# Patient Record
Sex: FEMALE
[De-identification: names, ages, dates, MRNs, and addresses within clinical notes are randomized per-mention and may not be internally consistent; named-entity substitution may affect disease eponyms.]

---
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
## 2018-08-22 ENCOUNTER — HOSPITAL ENCOUNTER (EMERGENCY)
Dept: HOSPITAL 45 - C.EDB | Age: 18
Discharge: HOME | End: 2018-08-22
Payer: COMMERCIAL

## 2018-08-22 VITALS
BODY MASS INDEX: 41.02 KG/M2 | WEIGHT: 293 LBS | BODY MASS INDEX: 41.02 KG/M2 | WEIGHT: 293 LBS | HEIGHT: 70.98 IN | HEIGHT: 70.98 IN

## 2018-08-22 VITALS — SYSTOLIC BLOOD PRESSURE: 133 MMHG | OXYGEN SATURATION: 97 % | HEART RATE: 76 BPM | DIASTOLIC BLOOD PRESSURE: 86 MMHG

## 2018-08-22 VITALS — TEMPERATURE: 98.24 F

## 2018-08-22 DIAGNOSIS — F12.90: ICD-10-CM

## 2018-08-22 DIAGNOSIS — Z79.899: ICD-10-CM

## 2018-08-22 DIAGNOSIS — R11.2: Primary | ICD-10-CM

## 2018-08-22 DIAGNOSIS — Z79.3: ICD-10-CM

## 2018-08-22 DIAGNOSIS — Z87.891: ICD-10-CM

## 2018-08-22 DIAGNOSIS — E11.9: ICD-10-CM

## 2018-08-22 DIAGNOSIS — R19.7: ICD-10-CM

## 2018-08-22 LAB
ALBUMIN SERPL-MCNC: 4.2 GM/DL (ref 3.4–5)
ALP SERPL-CCNC: 83 U/L (ref 45–117)
ALT SERPL-CCNC: 149 U/L (ref 12–78)
AST SERPL-CCNC: 83 U/L (ref 15–37)
BASOPHILS # BLD: 0.03 K/UL (ref 0–0.2)
BASOPHILS NFR BLD: 0.4 %
BUN SERPL-MCNC: 9 MG/DL (ref 7–18)
CALCIUM SERPL-MCNC: 9.9 MG/DL (ref 8.5–10.1)
CO2 SERPL-SCNC: 24 MMOL/L (ref 21–32)
CREAT SERPL-MCNC: 1.05 MG/DL (ref 0.6–1.2)
EOS ABS #: 0.3 K/UL (ref 0–0.5)
EOSINOPHIL NFR BLD AUTO: 351 K/UL (ref 130–400)
GLUCOSE SERPL-MCNC: 106 MG/DL (ref 70–99)
HCT VFR BLD CALC: 41.3 % (ref 37–47)
HGB BLD-MCNC: 14.2 G/DL (ref 12–16)
IG#: 0.01 K/UL (ref 0–0.02)
IMM GRANULOCYTES NFR BLD AUTO: 21.3 %
LIPASE: 210 U/L (ref 73–393)
LYMPHOCYTES # BLD: 1.57 K/UL (ref 1.2–3.4)
MCH RBC QN AUTO: 30.7 PG (ref 25–34)
MCHC RBC AUTO-ENTMCNC: 34.4 G/DL (ref 32–36)
MCV RBC AUTO: 89.2 FL (ref 80–100)
MONO ABS #: 0.54 K/UL (ref 0.11–0.59)
MONOCYTES NFR BLD: 7.3 %
NEUT ABS #: 4.93 K/UL (ref 1.4–6.5)
NEUTROPHILS # BLD AUTO: 4.1 %
NEUTROPHILS NFR BLD AUTO: 66.8 %
PMV BLD AUTO: 10.1 FL (ref 7.4–10.4)
POTASSIUM SERPL-SCNC: 3.7 MMOL/L (ref 3.5–5.1)
PROT SERPL-MCNC: 8.4 GM/DL (ref 6.4–8.2)
RED CELL DISTRIBUTION WIDTH CV: 12.9 % (ref 11.5–14.5)
RED CELL DISTRIBUTION WIDTH SD: 41.7 FL (ref 36.4–46.3)
SODIUM SERPL-SCNC: 139 MMOL/L (ref 136–145)
WBC # BLD AUTO: 7.38 K/UL (ref 4.8–10.8)

## 2018-08-22 NOTE — EMERGENCY ROOM VISIT NOTE
History


First contact with patient:  09:20


Chief Complaint:  VOMITING


Stated Complaint:  NAUSEA,VOMITING,DIARRHEA,STOMACH PAIN





History of Present Illness


The patient is a 18 year old female who presents to the Emergency Room with 

complaints of nausea x 4 weeks.  She was started on a new DM2 medication 

similar to Metformin called OZEMPIC in May.  She was titreated up to 1mg daily 

after 6 weeks.  On her 10th week of treatment she developed significant and 

persistent vomiting with meals.  She also reports 3-4x loose BM during this 

time period.  She can tolerate water OK.  She denies any recent travel, denies 

eating new foods, denies any antibiotic use in the past 3 months. 





She has taken TUMS or Pepto Bismol for the treatment of her symptoms without 

improvement.  





No other contacts have been sick.  She is a [employer] student, lives in [facility], was a 

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 





SHx:  Denies ETOH abuse, used marijuana 2-3x in the past month or so, her 

symptoms go away with marijuana use. 





ROS: No history of migraines.





Review of Systems


See HPI for pertinent positives and negatives.  A total of ten systems were 

reviewed and were otherwise negative.





   Constitutional:  No fever, No chills


   ENT:  No hearing loss


   Respiratory:  No cough, No sputum, No wheezing, No shortness of breath


   Cardiovascular:  No chest pain


   Abdomen:  + nausea, + diarrhea, No pain, No vomiting


   Musculoskeletal:  No joint pain


   Genitourinary - Female:  No dysuria, No urinary frequency, No urinary urgency

, No dysmenorrhea


   Endocrine:  No fatigue


   Integumentary:  No rash





Social History


Smoking Status:  Former Smoker





Current/Historical Medications


Scheduled


Birth Control Pills (Birth Control Pills), 1 TAB PO DAILY





Physical Exam


Vital Signs











  Date Time  Temp Pulse Resp B/P (MAP) Pulse Ox O2 Delivery O2 Flow Rate FiO2


 


8/22/18 12:56  62 20 124/78 99 Room Air  


 


8/22/18 11:34  68 18 141/68 97 Room Air  


 


8/22/18 09:08 36.8 92 18 143/95 97 Room Air  











Physical Exam


Gen: No acute distress.   Obese.





HEENT: Head - normocephalic and atraumatic.  Pupils are equal, round, and 

reactive to light.  Extraocular eye muscles are intact and sclera are 

anicteric.  Ears -  bilaterally patent canals with noninjected tympanic 

membranes and no evidence of hemotympanum.  Nose -  moist nasal mucosa without 

discharge. Mouth - moist buccal mucosa.  Oropharynx is nonerythematous and 

there is no tonsillar exudate or edema noted.





Neck: Supple; no JVD, nuchal rigidity, cervical lymphadenopathy, or auscultated 

bruits.





Heart: Regular rate and rhythm.  There is a normal S1 and S2 with no murmurs, 

clicks, or gallops appreciated.





Lungs: Clear to auscultation bilaterally with no wheezes, rales, or rhonchi.





Abdomen: Soft, completely nontender, nondistended, with good bowel sounds.  

There are no palpable pulsatile masses or hepatosplenomegaly.  There is no 

guarding, rigidity, or rebound noted.





Extremities: No evidence of cyanosis, clubbing, or edema.  There are easily 

palpable peripheral pulses.





Neuro:The patient is awake and alert, oriented to day, time, and place.  Muscle 

strength is 5/5 in all 4 extremities.  The patient has equal  strength and 

equal pedal push and pull.  There are no cerebellar signs.





Medical Decision & Procedures


ER Provider


Diagnostic Interpretation:


ABDOMINAL ULTRASOUND, RIGHT UPPER QUADRANT





HISTORY:     Elevated LFTs, nausea, vomiting. 





COMPARISON:  None.





FINDINGS: Increased hepatic echogenicity represents fatty infiltration. The


liver is moderately enlarged. No hepatic lesions are identified although


sensitivity is diminished on this exam. No gallstones are identified. There is


no biliary ductal dilatation. Pancreas is obscured. There is no right


hydronephrosis.





IMPRESSION: 








1. Fatty infiltration of the liver and hepatomegaly.





2. No gallstones or biliary ductal dilatation.





3. Obscured pancreas due to suboptimal penetration.





Laboratory Results


8/22/18 09:45








Red Blood Count 4.63, Mean Corpuscular Volume 89.2, Mean Corpuscular Hemoglobin 

30.7, Mean Corpuscular Hemoglobin Concent 34.4, Mean Platelet Volume 10.1, 

Neutrophils (%) (Auto) 66.8, Lymphocytes (%) (Auto) 21.3, Monocytes (%) (Auto) 

7.3, Eosinophils (%) (Auto) 4.1, Basophils (%) (Auto) 0.4, Neutrophils # (Auto) 

4.93, Lymphocytes # (Auto) 1.57, Monocytes # (Auto) 0.54, Eosinophils # (Auto) 

0.30, Basophils # (Auto) 0.03





8/22/18 09:45

















Test


  8/22/18


09:45


 


White Blood Count


  7.38 K/uL


(4.8-10.8)


 


Red Blood Count


  4.63 M/uL


(4.2-5.4)


 


Hemoglobin


  14.2 g/dL


(12.0-16.0)


 


Hematocrit 41.3 % (37-47) 


 


Mean Corpuscular Volume


  89.2 fL


()


 


Mean Corpuscular Hemoglobin


  30.7 pg


(25-34)


 


Mean Corpuscular Hemoglobin


Concent 34.4 g/dl


(32-36)


 


Platelet Count


  351 K/uL


(130-400)


 


Mean Platelet Volume


  10.1 fL


(7.4-10.4)


 


Neutrophils (%) (Auto) 66.8 % 


 


Lymphocytes (%) (Auto) 21.3 % 


 


Monocytes (%) (Auto) 7.3 % 


 


Eosinophils (%) (Auto) 4.1 % 


 


Basophils (%) (Auto) 0.4 % 


 


Neutrophils # (Auto)


  4.93 K/uL


(1.4-6.5)


 


Lymphocytes # (Auto)


  1.57 K/uL


(1.2-3.4)


 


Monocytes # (Auto)


  0.54 K/uL


(0.11-0.59)


 


Eosinophils # (Auto)


  0.30 K/uL


(0-0.5)


 


Basophils # (Auto)


  0.03 K/uL


(0-0.2)


 


RDW Standard Deviation


  41.7 fL


(36.4-46.3)


 


RDW Coefficient of Variation


  12.9 %


(11.5-14.5)


 


Immature Granulocyte % (Auto) 0.1 % 


 


Immature Granulocyte # (Auto)


  0.01 K/uL


(0.00-0.02)


 


Anion Gap


  10.0 mmol/L


(3-11)


 


Est Creatinine Clear Calc


Drug Dose 133.1 ml/min 


 


 


Estimated GFR (


American) 88.5 


 


 


Estimated GFR (Non-


American 76.4 


 


 


BUN/Creatinine Ratio 8.6 (10-20) 


 


Calcium Level


  9.9 mg/dl


(8.5-10.1)


 


Magnesium Level


  2.2 mg/dl


(1.8-2.4)


 


Total Bilirubin


  0.9 mg/dl


(0.2-1)


 


Aspartate Amino Transf


(AST/SGOT) 83 U/L (15-37) 


 


 


Alanine Aminotransferase


(ALT/SGPT) 149 U/L


(12-78)


 


Alkaline Phosphatase


  83 U/L


()


 


Total Protein


  8.4 gm/dl


(6.4-8.2)


 


Albumin


  4.2 gm/dl


(3.4-5.0)


 


Globulin


  4.2 gm/dl


(2.5-4.0)


 


Albumin/Globulin Ratio 1.0 (0.9-2) 


 


Lipase


  210 U/L


()


 


Thyroid Stimulating Hormone


(TSH) 2.730 uIu/ml


(0.300-4.500)











Medications Administered











 Medications


  (Trade)  Dose


 Ordered  Sig/Kenyatta


 Route  Start Time


 Stop Time Status Last Admin


Dose Admin


 


 Sodium Chloride  1,000 ml @ 


 999 mls/hr  Q1H1M STAT


 IV  8/22/18 09:37


 8/22/18 10:37 DC 8/22/18 10:25


999 MLS/HR


 


 Ondansetron HCl


  (Zofran Odt)  4 mg  NOW  STAT


 PO  8/22/18 09:38


 8/22/18 09:39 DC 8/22/18 10:22


4 MG


 


 Diphenhydramine


 HCl


  (Benadryl Inj)  12.5 mg  NOW  STAT


 IV  8/22/18 10:50


 8/22/18 10:52 DC 8/22/18 11:31


12.5 MG


 


 Ondansetron HCl


  (Zofran Odt)  4 mg  NOW  PRN


 PO  8/22/18 11:15


 9/21/18 11:14  8/22/18 11:33


4 MG











Medical Decision


The patient's care and disposition was discussed with Dr. Bowers, Attending 

ED Physician. 





This is a 18F with persistent nausea and vomiting.  Differential diagnosis 

include gastroenteritis, food borne illness, infections, obstruction, 

pancreatitis, appendicitis, diverticulitis, inflammatory bowel disease, GI bleed

, biliary pathology, toxicologic as well as others were entertained.  





Triage Nursing notes were reviewed.  ED Course included an extensive history 

and physical exam, labs.





9:15AM - Pt was seen and examined at bedside and initial orders placed.  1L NSS

, labs and 4mg ODT Zofran.


9:30AM - Case discussed with Dr. Bowers.  


10:30AM - Pt was re-evaluated, states she felt better with Zofran and fluids.  

Advised to follow up with PCP.  


11:30AM - Labs reviewed with patient, pt denies any history of liver issues, 

RUQ US was ordered.  


1:00PM - RUQ US consistent with fatty liver disease.  Pt will need further 

outpatient workup such as Hep Panel and possible trending of LFTs.  





At this moment we will have to control symptoms with Zofran and request 

outpatient follow up.  Pt will be discharged on a Zofran dose pack.  





The pt was informed about the findings as listed above.  All questions were 

answered.  Return instructions were outlined and the patient was discharged in 

good condition.  





The patient was referred to PCP for recheck of the current condition.





Head Trauma


GCS Score:  15





Impression





 Primary Impression:  


 Nausea, vomiting, and diarrhea





Departure Information


Dispostion


Home / Self-Care





Condition


GOOD





Referrals


No Doctor, Assigned (PCP)





Patient Instructions


My [facility], NAFLD





Additional Instructions





Your liver enzymes were elevated on admission.  Your liver Ultrasound showed 

evidence of fat in the liver which is likely the cause of your liver enzyme 

elevation.  There are a number of causes for elevated liver enzymes, this can 

be from medications, having a lipid (fatty) liver, or certain chronic viral 

diseases.   You should follow this up with a Primary Care Provider in [place].  Further testing includes possible stools tests for diarrhea or repeat 

lab work to track liver function. 





Information on non alcoholic fatty liver disease will be printed for you on 

discharge.  





You will also be given a prescription for Zofran for your nausea.  Take Zofran 

as prescribed for nausea or vomiting.





Stay well hydrated.  Fluids, especially water, are essential to avoid 

dehydration.


Resident Involvement:  Resident Care Provided


Care Provided:  [facility] ED

## 2018-08-22 NOTE — DIAGNOSTIC IMAGING REPORT
ABDOMINAL ULTRASOUND, RIGHT UPPER QUADRANT



HISTORY:     Elevated LFTs, nausea, vomiting. 



COMPARISON:  None.



FINDINGS: Increased hepatic echogenicity represents fatty infiltration. The

liver is moderately enlarged. No hepatic lesions are identified although

sensitivity is diminished on this exam. No gallstones are identified. There is

no biliary ductal dilatation. Pancreas is obscured. There is no right

hydronephrosis.



IMPRESSION: 





1. Fatty infiltration of the liver and hepatomegaly.



2. No gallstones or biliary ductal dilatation.



3. Obscured pancreas due to suboptimal penetration.







Electronically signed by:  Blane Hudson M.D.

8/22/2018 12:54 PM



Dictated Date/Time:  8/22/2018 12:53 PM

## 2018-08-22 NOTE — EMERGENCY ROOM VISIT NOTE
ED Visit Note


First contact with patient:  09:34


Patient seen and evaluated after discussion with the resident.  Patient's labs 

and imaging reassuring.  Mild elevation of transaminases noted.  Discussed with 

patient possible differential diagnosis for this.  Advise close follow-up with 

family doctor and recheck of LFTs in a week.  Discussed symptoms to watch and 

return for, she verbalized understanding was agreeable with plan.